# Patient Record
Sex: MALE | Race: BLACK OR AFRICAN AMERICAN | Employment: FULL TIME | URBAN - METROPOLITAN AREA
[De-identification: names, ages, dates, MRNs, and addresses within clinical notes are randomized per-mention and may not be internally consistent; named-entity substitution may affect disease eponyms.]

---

## 2021-11-22 ENCOUNTER — HOSPITAL ENCOUNTER (INPATIENT)
Age: 56
LOS: 2 days | Discharge: HOME | DRG: 683 | End: 2021-11-24
Attending: EMERGENCY MEDICINE | Admitting: INTERNAL MEDICINE
Payer: COMMERCIAL

## 2021-11-22 ENCOUNTER — APPOINTMENT (OUTPATIENT)
Dept: CT IMAGING | Age: 56
DRG: 683 | End: 2021-11-22
Attending: EMERGENCY MEDICINE
Payer: COMMERCIAL

## 2021-11-22 DIAGNOSIS — N17.9 ACUTE RENAL FAILURE, UNSPECIFIED ACUTE RENAL FAILURE TYPE (HCC): Primary | ICD-10-CM

## 2021-11-22 LAB
ALBUMIN SERPL-MCNC: 3.9 G/DL (ref 3.5–5)
ALBUMIN/GLOB SERPL: 0.9 {RATIO} (ref 1.1–2.2)
ALP SERPL-CCNC: 95 U/L (ref 45–117)
ALT SERPL-CCNC: 41 U/L (ref 12–78)
ANION GAP SERPL CALC-SCNC: 11 MMOL/L (ref 5–15)
APPEARANCE UR: ABNORMAL
AST SERPL-CCNC: 32 U/L (ref 15–37)
BACTERIA URNS QL MICRO: NEGATIVE /HPF
BASOPHILS # BLD: 0 K/UL (ref 0–0.1)
BASOPHILS NFR BLD: 0 % (ref 0–1)
BILIRUB SERPL-MCNC: 0.3 MG/DL (ref 0.2–1)
BILIRUB UR QL: NEGATIVE
BUN SERPL-MCNC: 34 MG/DL (ref 6–20)
BUN/CREAT SERPL: 8 (ref 12–20)
CALCIUM SERPL-MCNC: 9.2 MG/DL (ref 8.5–10.1)
CHLORIDE SERPL-SCNC: 102 MMOL/L (ref 97–108)
CO2 SERPL-SCNC: 28 MMOL/L (ref 21–32)
COLOR UR: ABNORMAL
CREAT SERPL-MCNC: 4.34 MG/DL (ref 0.7–1.3)
DIFFERENTIAL METHOD BLD: ABNORMAL
EOSINOPHIL # BLD: 0.1 K/UL (ref 0–0.4)
EOSINOPHIL NFR BLD: 1 % (ref 0–7)
EPITH CASTS URNS QL MICRO: ABNORMAL /LPF
ERYTHROCYTE [DISTWIDTH] IN BLOOD BY AUTOMATED COUNT: 14.4 % (ref 11.5–14.5)
GLOBULIN SER CALC-MCNC: 4.4 G/DL (ref 2–4)
GLUCOSE SERPL-MCNC: 102 MG/DL (ref 65–100)
GLUCOSE UR STRIP.AUTO-MCNC: NEGATIVE MG/DL
HCT VFR BLD AUTO: 41.9 % (ref 36.6–50.3)
HGB BLD-MCNC: 13.4 G/DL (ref 12.1–17)
HGB UR QL STRIP: ABNORMAL
IMM GRANULOCYTES # BLD AUTO: 0 K/UL (ref 0–0.04)
IMM GRANULOCYTES NFR BLD AUTO: 0 % (ref 0–0.5)
KETONES UR QL STRIP.AUTO: NEGATIVE MG/DL
LEUKOCYTE ESTERASE UR QL STRIP.AUTO: ABNORMAL
LYMPHOCYTES # BLD: 1.2 K/UL (ref 0.8–3.5)
LYMPHOCYTES NFR BLD: 13 % (ref 12–49)
MAGNESIUM SERPL-MCNC: 4 MG/DL (ref 1.6–2.4)
MCH RBC QN AUTO: 28.5 PG (ref 26–34)
MCHC RBC AUTO-ENTMCNC: 32 G/DL (ref 30–36.5)
MCV RBC AUTO: 89.1 FL (ref 80–99)
MONOCYTES # BLD: 0.8 K/UL (ref 0–1)
MONOCYTES NFR BLD: 9 % (ref 5–13)
NEUTS SEG # BLD: 6.6 K/UL (ref 1.8–8)
NEUTS SEG NFR BLD: 76 % (ref 32–75)
NITRITE UR QL STRIP.AUTO: POSITIVE
NRBC # BLD: 0 K/UL (ref 0–0.01)
NRBC BLD-RTO: 0 PER 100 WBC
PH UR STRIP: 7 [PH] (ref 5–8)
PLATELET # BLD AUTO: 216 K/UL (ref 150–400)
PMV BLD AUTO: 10.2 FL (ref 8.9–12.9)
POTASSIUM SERPL-SCNC: 4.4 MMOL/L (ref 3.5–5.1)
PROT SERPL-MCNC: 8.3 G/DL (ref 6.4–8.2)
PROT UR STRIP-MCNC: 100 MG/DL
RBC # BLD AUTO: 4.7 M/UL (ref 4.1–5.7)
RBC #/AREA URNS HPF: >100 /HPF (ref 0–5)
SODIUM SERPL-SCNC: 141 MMOL/L (ref 136–145)
SP GR UR REFRACTOMETRY: 1.01 (ref 1–1.03)
UA: UC IF INDICATED,UAUC: ABNORMAL
UROBILINOGEN UR QL STRIP.AUTO: 0.2 EU/DL (ref 0.2–1)
WBC # BLD AUTO: 8.7 K/UL (ref 4.1–11.1)
WBC URNS QL MICRO: ABNORMAL /HPF (ref 0–4)

## 2021-11-22 PROCEDURE — 83735 ASSAY OF MAGNESIUM: CPT

## 2021-11-22 PROCEDURE — 99285 EMERGENCY DEPT VISIT HI MDM: CPT

## 2021-11-22 PROCEDURE — 65270000029 HC RM PRIVATE

## 2021-11-22 PROCEDURE — 36415 COLL VENOUS BLD VENIPUNCTURE: CPT

## 2021-11-22 PROCEDURE — 74011250636 HC RX REV CODE- 250/636: Performed by: EMERGENCY MEDICINE

## 2021-11-22 PROCEDURE — 81001 URINALYSIS AUTO W/SCOPE: CPT

## 2021-11-22 PROCEDURE — 93005 ELECTROCARDIOGRAM TRACING: CPT

## 2021-11-22 PROCEDURE — 85025 COMPLETE CBC W/AUTO DIFF WBC: CPT

## 2021-11-22 PROCEDURE — 70450 CT HEAD/BRAIN W/O DYE: CPT

## 2021-11-22 PROCEDURE — 74011250636 HC RX REV CODE- 250/636: Performed by: INTERNAL MEDICINE

## 2021-11-22 PROCEDURE — 80053 COMPREHEN METABOLIC PANEL: CPT

## 2021-11-22 RX ORDER — ACETAMINOPHEN 650 MG/1
650 SUPPOSITORY RECTAL
Status: DISCONTINUED | OUTPATIENT
Start: 2021-11-22 | End: 2021-11-24 | Stop reason: HOSPADM

## 2021-11-22 RX ORDER — ACETAMINOPHEN 325 MG/1
650 TABLET ORAL
Status: DISCONTINUED | OUTPATIENT
Start: 2021-11-22 | End: 2021-11-24 | Stop reason: HOSPADM

## 2021-11-22 RX ORDER — SODIUM CHLORIDE 0.9 % (FLUSH) 0.9 %
5-40 SYRINGE (ML) INJECTION EVERY 8 HOURS
Status: DISCONTINUED | OUTPATIENT
Start: 2021-11-22 | End: 2021-11-24 | Stop reason: HOSPADM

## 2021-11-22 RX ORDER — SODIUM CHLORIDE 9 MG/ML
50 INJECTION, SOLUTION INTRAVENOUS CONTINUOUS
Status: DISCONTINUED | OUTPATIENT
Start: 2021-11-22 | End: 2021-11-23

## 2021-11-22 RX ORDER — POLYETHYLENE GLYCOL 3350 17 G/17G
17 POWDER, FOR SOLUTION ORAL DAILY PRN
Status: DISCONTINUED | OUTPATIENT
Start: 2021-11-22 | End: 2021-11-24 | Stop reason: HOSPADM

## 2021-11-22 RX ORDER — SODIUM CHLORIDE 0.9 % (FLUSH) 0.9 %
5-40 SYRINGE (ML) INJECTION AS NEEDED
Status: DISCONTINUED | OUTPATIENT
Start: 2021-11-22 | End: 2021-11-24 | Stop reason: HOSPADM

## 2021-11-22 RX ORDER — ONDANSETRON 2 MG/ML
4 INJECTION INTRAMUSCULAR; INTRAVENOUS
Status: DISCONTINUED | OUTPATIENT
Start: 2021-11-22 | End: 2021-11-24 | Stop reason: HOSPADM

## 2021-11-22 RX ORDER — LEVETIRACETAM 750 MG/1
750 TABLET ORAL 2 TIMES DAILY
Status: ON HOLD | COMMUNITY
End: 2021-11-24

## 2021-11-22 RX ORDER — ONDANSETRON 4 MG/1
4 TABLET, ORALLY DISINTEGRATING ORAL
Status: DISCONTINUED | OUTPATIENT
Start: 2021-11-22 | End: 2021-11-24 | Stop reason: HOSPADM

## 2021-11-22 RX ORDER — HEPARIN SODIUM 5000 [USP'U]/ML
5000 INJECTION, SOLUTION INTRAVENOUS; SUBCUTANEOUS EVERY 8 HOURS
Status: DISCONTINUED | OUTPATIENT
Start: 2021-11-22 | End: 2021-11-23

## 2021-11-22 RX ORDER — LAMOTRIGINE 200 MG/1
200 TABLET, EXTENDED RELEASE ORAL DAILY
Status: ON HOLD | COMMUNITY
End: 2021-11-24

## 2021-11-22 RX ORDER — TAMSULOSIN HYDROCHLORIDE 0.4 MG/1
0.8 CAPSULE ORAL DAILY
COMMUNITY

## 2021-11-22 RX ADMIN — SODIUM CHLORIDE 1000 ML: 9 INJECTION, SOLUTION INTRAVENOUS at 14:10

## 2021-11-22 RX ADMIN — Medication 10 ML: at 21:48

## 2021-11-22 RX ADMIN — SODIUM CHLORIDE 50 ML/HR: 9 INJECTION, SOLUTION INTRAVENOUS at 21:45

## 2021-11-22 NOTE — ED TRIAGE NOTES
Attempted triage pt in bathroom with wife for extended period did not open door when assist offered. Pt then  was wheeled to the treatment area accompanied by his wife. Pt wife states Afshin Field is being treated for enlarged prostate and they had to put a catheter in today at [facility] after putting the catheter in he had dizziness that persisted so they said to bring him to the ER for further evaluation. \" Pt states \"we got something to eat before coming here and the dizziness is getting better. \" Pt denies other symptoms. Dr Joan Padilla in room. Pt wife now states \"the dizziness was there before they put the catheter in and they emptied 2 liters. The dizziness has been off and on for a few days\" Pt states \"I feel the dizziness increased after they took the fluids out. \"

## 2021-11-22 NOTE — ED NOTES
Pt arrrived to er with meza catheter that was placed today at [facility] urology. Pt is draining orange colored urine.

## 2021-11-22 NOTE — ED PROVIDER NOTES
60-year-old male with a history of enlarged prostate presents with chief complaint of lightheadedness. Patient reports that he has had intermittent lightheadedness for several days. He went to the urologist today and was found to have 2 L of retained urine. Corea catheter was placed. His lightheadedness became much worse after the catheter was placed. Patient did stop and get something to eat on the way to the emergency department and reports that he is now feeling somewhat better. He denies any other symptoms. No past medical history on file. No past surgical history on file. No family history on file. Social History     Socioeconomic History    Marital status: Not on file     Spouse name: Not on file    Number of children: Not on file    Years of education: Not on file    Highest education level: Not on file   Occupational History    Not on file   Tobacco Use    Smoking status: Not on file    Smokeless tobacco: Not on file   Substance and Sexual Activity    Alcohol use: Not on file    Drug use: Not on file    Sexual activity: Not on file   Other Topics Concern    Not on file   Social History Narrative    Not on file     Social Determinants of Health     Financial Resource Strain:     Difficulty of Paying Living Expenses: Not on file   Food Insecurity:     Worried About Running Out of Food in the Last Year: Not on file    Sukhi of Food in the Last Year: Not on file   Transportation Needs:     Lack of Transportation (Medical): Not on file    Lack of Transportation (Non-Medical):  Not on file   Physical Activity:     Days of Exercise per Week: Not on file    Minutes of Exercise per Session: Not on file   Stress:     Feeling of Stress : Not on file   Social Connections:     Frequency of Communication with Friends and Family: Not on file    Frequency of Social Gatherings with Friends and Family: Not on file    Attends [employer] Services: Not on file    Active Member of Clubs or Organizations: Not on file    Attends Club or Organization Meetings: Not on file    Marital Status: Not on file   Intimate Partner Violence:     Fear of Current or Ex-Partner: Not on file    Emotionally Abused: Not on file    Physically Abused: Not on file    Sexually Abused: Not on file   Housing Stability:     Unable to Pay for Housing in the Last Year: Not on file    Number of [place] in the Last Year: Not on file    Unstable Housing in the Last Year: Not on file         ALLERGIES: Patient has no known allergies. Review of Systems   Constitutional: Negative for fever. HENT: Negative for rhinorrhea. Respiratory: Negative for cough. Cardiovascular: Negative for chest pain. Gastrointestinal: Negative for abdominal pain. Genitourinary: Negative for dysuria. Musculoskeletal: Negative for back pain. Skin: Negative for wound. Neurological: Positive for light-headedness. Negative for headaches. Psychiatric/Behavioral: Negative for confusion. There were no vitals filed for this visit. Physical Exam  Vitals and nursing note reviewed. Constitutional:       General: He is not in acute distress. Appearance: Normal appearance. He is not ill-appearing, toxic-appearing or diaphoretic. HENT:      Head: Normocephalic. Mouth/Throat:      Mouth: Mucous membranes are dry. Eyes:      Extraocular Movements: Extraocular movements intact. Cardiovascular:      Rate and Rhythm: Normal rate. Pulses: Normal pulses. Heart sounds: Normal heart sounds. Pulmonary:      Effort: Pulmonary effort is normal. No respiratory distress. Breath sounds: Normal breath sounds. Abdominal:      General: Abdomen is flat. Bowel sounds are normal. There is no distension. Palpations: Abdomen is soft. Tenderness: There is no abdominal tenderness. There is no guarding. Musculoskeletal:         General: Normal range of motion.       Cervical back: Normal range of motion. Skin:     General: Skin is warm and dry. Capillary Refill: Capillary refill takes less than 2 seconds. Neurological:      General: No focal deficit present. Mental Status: He is alert and oriented to person, place, and time. Cranial Nerves: No cranial nerve deficit. Motor: No weakness. Gait: Gait normal.   Psychiatric:         Mood and Affect: Mood normal.          MDM  Number of Diagnoses or Management Options  Acute renal failure, unspecified acute renal failure type [facility])  Diagnosis management comments: Patient presents with dizziness. He is able to ambulate normally. Work-up reveals acute renal failure likely due to obstructive uropathy. Patient has a Corea catheter in place. He will be admitted to hospital medicine for further management. He is comfortable and agreeable to plan of care and will be transferred to [address] [place] [address] by ambulance. EKG shows normal sinus rhythm at a rate of 82, normal intervals, normal axis, no ischemic changes. Perfect Serve Consult for Admission  3:51 PM    ED Room Number: WER15/15  Patient Name and age:  Deepa Payton 55 y.o.  male  Working Diagnosis: Acute renal failure, unspecified acute renal failure type ([facility] [address]  (primary encounter diagnosis)    COVID-19 Suspicion:  no  Sepsis present:  no  Reassessment needed: no  Code Status:  Full Code  Readmission: no  Isolation Requirements:  no  Recommended Level of Care:  telemetry  Department:[facility] ED - [phone]  Other: Postobstructive. Patient seen at [facility] and had a Corea catheter placed.          Amount and/or Complexity of Data Reviewed  Clinical lab tests: ordered and reviewed  Tests in the radiology section of CPT®: ordered and reviewed           Procedures

## 2021-11-23 ENCOUNTER — APPOINTMENT (OUTPATIENT)
Dept: ULTRASOUND IMAGING | Age: 56
DRG: 683 | End: 2021-11-23
Attending: INTERNAL MEDICINE
Payer: COMMERCIAL

## 2021-11-23 PROBLEM — N40.0 BPH (BENIGN PROSTATIC HYPERPLASIA): Status: ACTIVE | Noted: 2021-11-23

## 2021-11-23 PROBLEM — G40.909 SEIZURE DISORDER (HCC): Status: ACTIVE | Noted: 2021-11-23

## 2021-11-23 PROBLEM — R03.0 ELEVATED BP WITHOUT DIAGNOSIS OF HYPERTENSION: Status: ACTIVE | Noted: 2021-11-23

## 2021-11-23 LAB
ANION GAP SERPL CALC-SCNC: 5 MMOL/L (ref 5–15)
BUN SERPL-MCNC: 26 MG/DL (ref 6–20)
BUN/CREAT SERPL: 10 (ref 12–20)
CALCIUM SERPL-MCNC: 9.3 MG/DL (ref 8.5–10.1)
CHLORIDE SERPL-SCNC: 117 MMOL/L (ref 97–108)
CO2 SERPL-SCNC: 26 MMOL/L (ref 21–32)
COMMENT, HOLDF: NORMAL
CREAT SERPL-MCNC: 2.55 MG/DL (ref 0.7–1.3)
GLUCOSE SERPL-MCNC: 92 MG/DL (ref 65–100)
POTASSIUM SERPL-SCNC: 4.4 MMOL/L (ref 3.5–5.1)
SAMPLES BEING HELD,HOLD: NORMAL
SODIUM SERPL-SCNC: 148 MMOL/L (ref 136–145)

## 2021-11-23 PROCEDURE — 74011250636 HC RX REV CODE- 250/636: Performed by: INTERNAL MEDICINE

## 2021-11-23 PROCEDURE — 76770 US EXAM ABDO BACK WALL COMP: CPT

## 2021-11-23 PROCEDURE — 94760 N-INVAS EAR/PLS OXIMETRY 1: CPT

## 2021-11-23 PROCEDURE — 97116 GAIT TRAINING THERAPY: CPT

## 2021-11-23 PROCEDURE — 65660000000 HC RM CCU STEPDOWN

## 2021-11-23 PROCEDURE — 74011250637 HC RX REV CODE- 250/637: Performed by: INTERNAL MEDICINE

## 2021-11-23 PROCEDURE — 97161 PT EVAL LOW COMPLEX 20 MIN: CPT

## 2021-11-23 PROCEDURE — 80048 BASIC METABOLIC PNL TOTAL CA: CPT

## 2021-11-23 RX ORDER — SODIUM CHLORIDE 9 MG/ML
100 INJECTION, SOLUTION INTRAVENOUS CONTINUOUS
Status: DISCONTINUED | OUTPATIENT
Start: 2021-11-23 | End: 2021-11-24 | Stop reason: HOSPADM

## 2021-11-23 RX ORDER — TAMSULOSIN HYDROCHLORIDE 0.4 MG/1
0.8 CAPSULE ORAL DAILY
Status: DISCONTINUED | OUTPATIENT
Start: 2021-11-23 | End: 2021-11-24 | Stop reason: HOSPADM

## 2021-11-23 RX ORDER — LAMOTRIGINE 200 MG/1
400 TABLET, EXTENDED RELEASE ORAL 2 TIMES DAILY
Status: DISCONTINUED | OUTPATIENT
Start: 2021-11-23 | End: 2021-11-24 | Stop reason: HOSPADM

## 2021-11-23 RX ORDER — LEVETIRACETAM 500 MG/1
1500 TABLET, EXTENDED RELEASE ORAL DAILY
Status: DISCONTINUED | OUTPATIENT
Start: 2021-11-23 | End: 2021-11-23

## 2021-11-23 RX ORDER — LEVETIRACETAM 750 MG/1
1500 TABLET, EXTENDED RELEASE ORAL DAILY
Status: DISCONTINUED | OUTPATIENT
Start: 2021-11-24 | End: 2021-11-24 | Stop reason: HOSPADM

## 2021-11-23 RX ORDER — LEVETIRACETAM 750 MG/1
2250 TABLET, EXTENDED RELEASE ORAL
Status: DISCONTINUED | OUTPATIENT
Start: 2021-11-23 | End: 2021-11-24 | Stop reason: HOSPADM

## 2021-11-23 RX ADMIN — LEVETIRACETAM 2250 MG: 750 TABLET, EXTENDED RELEASE ORAL at 21:04

## 2021-11-23 RX ADMIN — LAMOTRIGINE 400 MG: 200 TABLET, EXTENDED RELEASE ORAL at 07:00

## 2021-11-23 RX ADMIN — LAMOTRIGINE 400 MG: 200 TABLET, EXTENDED RELEASE ORAL at 21:03

## 2021-11-23 RX ADMIN — LEVETIRACETAM 1500 MG: 500 TABLET, EXTENDED RELEASE ORAL at 06:38

## 2021-11-23 RX ADMIN — Medication 10 ML: at 14:00

## 2021-11-23 RX ADMIN — TAMSULOSIN HYDROCHLORIDE 0.8 MG: 0.4 CAPSULE ORAL at 08:32

## 2021-11-23 RX ADMIN — Medication 10 ML: at 06:39

## 2021-11-23 RX ADMIN — SODIUM CHLORIDE 100 ML/HR: 9 INJECTION, SOLUTION INTRAVENOUS at 01:34

## 2021-11-23 RX ADMIN — Medication 10 ML: at 21:03

## 2021-11-23 NOTE — PROGRESS NOTES
Bedside shift change report given to [facility], RN (oncoming nurse) by Jayla Hernandez RN (offgoing nurse). Report included the following information SBAR, Kardex, Intake/Output, MAR and Recent Results.

## 2021-11-23 NOTE — PROGRESS NOTES
Problem: Patient Education: Go to Patient Education Activity  Goal: Patient/Family Education  Outcome: Progressing Towards Goal   PHYSICAL THERAPY EVALUATION/DISCHARGE  Patient: Khushi Rodriguez (40 y.o. male)  Date: 11/23/2021  Primary Diagnosis: ROSAURA (acute kidney injury) ([facility] [address] [N17.9]        Precautions:          ASSESSMENT  Based on the objective data described below, the patient presents with admission due to ROSAURA with recent procedure due enlarged prostate/BPH. Pt c/o dizziness and inabliity to void s/p procedure requiring coming to ER. Pt received supine in bed with meza in place. Stating dizziness has improved. Orthostatic BP taken with no orthostatic BP findings and no c/o dizziness. Pt demonstrating independence with all mobility and good meza management with gait of 50' in room. Pt cleared to be up ad trent. RN notified. Functional Outcome Measure: The patient scored 90/100 on the barthel outcome measure     Other factors to consider for discharge: none     Further skilled acute physical therapy is not indicated at this time. PLAN :  Recommendation for discharge: (in order for the patient to meet his/her long term goals)  No skilled physical therapy/ follow up rehabilitation needs identified at this time. This discharge recommendation:  Has not yet been discussed the attending provider and/or case management    IF patient discharges home will need the following DME: none       SUBJECTIVE:   Patient stated I am ok.     OBJECTIVE DATA SUMMARY:   HISTORY:    Past Medical History:   Diagnosis Date    Ill-defined condition     prostate enlarged    Seizures ([facility] [address]      Past Surgical History:   Procedure Laterality Date    HX GI      hemmoriod    HX UROLOGICAL      Vasectomy       Prior level of function: independent and working  Personal factors and/or comorbidities impacting plan of care: per above and below    Home Situation  Home Environment: Private residence  # Steps to Enter: 5  [employer] to Enter: Yes  Hand Rails : Bilateral  One/Two Story Residence: Two story  # of Interior Steps: 13  Interior Rails: Both  Lift Chair Available: No  Living Alone: No  Support Systems: Spouse/Significant Other, Child(isabella)  Patient Expects to be Discharged to[de-identified] [employer]  Current DME Used/Available at Home: None  Tub or Shower Type: Shower    EXAMINATION/PRESENTATION/DECISION MAKING:   Critical Behavior:  Neurologic State: Alert  Orientation Level: Oriented X4  Cognition: Follows commands, Appropriate decision making, Appropriate for age attention/concentration, Appropriate safety awareness     Hearing: Auditory  Auditory Impairment: None  Skin:  IV; meza  Edema: WNL  Range Of Motion:  AROM: Within functional limits                       Strength:    Strength: Within functional limits                    Tone & Sensation:   Tone: Normal              Sensation: Intact               Coordination:  Coordination: Within functional limits  Vision:      Functional Mobility:  Bed Mobility:  Rolling: Independent  Supine to Sit: Independent  Sit to Supine: Independent  Scooting: Independent  Transfers:  Sit to Stand: Independent  Stand to Sit: Independent                       Balance:   Sitting: Intact  Standing: Intact  Ambulation/Gait Training:  Distance (ft): 50 Feet (ft)  Assistive Device: Gait belt  Ambulation - Level of Assistance: Independent                                           Functional Measure:  Barthel Index:    Bathin  Bladder: 0 (meza)  Bowels: 10  Groomin  Dressing: 10  Feeding: 10  Mobility: 15  Stairs: 10  Toilet Use: 10  Transfer (Bed to Chair and Back): 15  Total: 90/100       The Barthel ADL Index: Guidelines  1. The index should be used as a record of what a patient does, not as a record of what a patient could do. 2. The main aim is to establish degree of independence from any help, physical or verbal, however minor and for whatever reason.   3. The need for supervision renders the patient not independent. 4. A patient's performance should be established using the best available evidence. Asking the patient, friends/relatives and nurses are the usual sources, but direct observation and common sense are also important. However direct testing is not needed. 5. Usually the patient's performance over the preceding 24-48 hours is important, but occasionally longer periods will be relevant. 6. Middle categories imply that the patient supplies over 50 per cent of the effort. 7. Use of aids to be independent is allowed. Score Interpretation (from [address] [facility])    Independent   60-79 Minimally independent   40-59 Partially dependent   20-39 Very dependent   <20 Totally dependent     -Shahid Norwood., Barthel, D.W. (1965). Functional evaluation: the Barthel Index. [facility] ([address] (1997). The Barthel activities of daily living index: self-reporting versus actual performance in the old (> or = 75 years). Journal of [address] [place] [address] 45(7), [address] [facility], JMILKAF, Rachael Montgomery., [facility]. (1999). Measuring the change in disability after inpatient rehabilitation; comparison of the responsiveness of the Barthel Index and Functional Humphreys Measure. Journal of Neurology, Neurosurgery, and Psychiatry, 66(4), 418-573. Dionisio Cabrera, N.J.A, ETHAN Collins, & Keysha Massey MТатьянаA. (2004) Assessment of post-stroke quality of life in cost-effectiveness studies: The usefulness of the Barthel Index and the EuroQoL-5D.  Quality of Life Research, 15, 115-70           Physical Therapy Evaluation Charge Determination   History Examination Presentation Decision-Making   LOW Complexity : Zero comorbidities / personal factors that will impact the outcome / POC LOW Complexity : 1-2 Standardized tests and measures addressing body structure, function, activity limitation and / or participation in recreation  LOW Complexity : Stable, uncomplicated Other outcome measures barthel  LOW       Based on the above components, the patient evaluation is determined to be of the following complexity level: LOW     Pain Rating:  No c/o pain    Activity Tolerance:   Good      After treatment patient left in no apparent distress:   Sitting in chair with call bell    COMMUNICATION/EDUCATION:   The patients plan of care was discussed with: Occupational therapist and Registered nurse. Fall prevention education was provided and the patient/caregiver indicated understanding., Patient/family have participated as able in goal setting and plan of care. , and Patient/family agree to work toward stated goals and plan of care.     Thank you for this referral.  González Andrews, PT   Time Calculation: 23 mins

## 2021-11-23 NOTE — CONSULTS
New Urology Consult Note    Patient: Adriana Hayward MRN: [id number]  SSN: [id number]    YOB: 1965  Age: 55 y.o. Sex: male            Assessment:     Adriana Hayward is a 55 y.o. male with a PMH of seizure disorder and BPH. Pt seen by [facility], Dr. Negro Camacho, yesterday due to urinary retention; meza was placed in office with approx 2L of urine return. Reported intermittent dizziness that occurred prior to meza placement and continued after. Pt was referred to ER for further assessment and was admitted due to ROSAURA. Recommendations:     1. Meza to remain for bladder decompression  2. Continue Flomax  3. Cr 4.99 in office yesterday, today 2.55  4. F/U as scheduled OP; will recheck BMP at this time - appt placed in discharge orders     OK to discharge from Urology perspective. Will sign off. Thank you for this consult. Please contact [facility] with any further questions/concerns. Belinda Espinoza NP [phone]    History of Present Illness:     Chief Complaint:  Urinary retention     Adriana Hayward is seen in consultation for reasons noted above at the request of Nasim Walls DO. This is a 55 y.o. male with a known urologic history of BPH seen at [facility]. Pt presents with urinary retention r/t BPH, reporting he had not voiding in 2-3 days. Was seen in office by Bassem Clouding was placed returning 2L urine, providing relief from retention. After procedure, pt reported intermittent dizziness prior to and post meza placement and was referred to ER for further evaluation. Currently taking flomax and tadalafil to alleviate symptoms of BPH. Pt has scheduled surgical intervention, PVP, on 12/10/21 with Dr. Kimberly Gutierrez.      VSS   No leukocytosis  Cr 2.55 (4.34)  Good urine output     Subjective     Past Medical History  Past Medical History:   Diagnosis Date    Ill-defined condition     prostate enlarged    Seizures ([address]        Past Surgical History:   Past Surgical History:   Procedure Laterality Date    HX GI      hemmoriod    HX UROLOGICAL      Vasectomy       Medication:  Current Facility-Administered Medications   Medication Dose Route Frequency Provider Last Rate Last Admin    lamoTRIgine (LaMICtal XR) ER tablet 400 mg (Patient Supplied)  400 mg Oral BID Payton Maldonado MD   400 mg at 11/23/21 0700    tamsulosin (FLOMAX) capsule 0.8 mg  0.8 mg Oral DAILY Payton Maldonado MD   0.8 mg at 11/23/21 5885    0.9% sodium chloride infusion  100 mL/hr IntraVENous CONTINUOUS Payton Maldonado  mL/hr at 11/23/21 0134 100 mL/hr at 11/23/21 0134    levETIRAcetam (KEPPRA XR) ER tablet 2,250 mg (Patient Supplied)  2,250 mg Oral QHS Payton Maldonado MD        [START ON 11/24/2021] levETIRAcetam (KEPPRA XR) ER tablet 1,500 mg (Patient Supplied)  1,500 mg Oral DAILY Payton Maldonado MD        sodium chloride (NS) flush 5-40 mL  5-40 mL IntraVENous Q8H Namrata Fairly, DO   10 mL at 11/23/21 5956    sodium chloride (NS) flush 5-40 mL  5-40 mL IntraVENous PRN Namrata Fairly, DO        acetaminophen (TYLENOL) tablet 650 mg  650 mg Oral Q6H PRN Namrata Fairly, DO        Or    acetaminophen (TYLENOL) suppository 650 mg  650 mg Rectal Q6H PRN Namrata Fairly, DO        polyethylene glycol (MIRALAX) packet 17 g  17 g Oral DAILY PRN Namrtaa Fairly, DO        ondansetron (ZOFRAN ODT) tablet 4 mg  4 mg Oral Q8H PRN Conner, Claudia, DO        Or    ondansetron (ZOFRAN) injection 4 mg  4 mg IntraVENous Q6H PRN Namrata Fairly, DO           Allergies:  No Known Allergies    Social History:  Social History     Tobacco Use    Smoking status: Never Smoker    Smokeless tobacco: Never Used   Substance Use Topics    Alcohol use: Never    Drug use: Never       Family History  Family History   Problem Relation Age of Onset    Prostate Cancer Father        Review of Systems  Unchanged from admitting provider note from 11/23/21 other than HPI.     Objective:     Vital signs in last 24 hours:  Visit Vitals  /80 (BP 1 Location: Right upper arm, BP Patient Position: At rest)   Pulse 85   Temp 98.8 °F (37.1 °C)   Resp 18   Ht 5' 10\" (1.778 m)   Wt 92.1 kg (203 lb)   SpO2 92%   BMI 29.13 kg/m²       Intake/Output last 3 shifts:  Date 11/22/21 0700 - 11/23/21 0659 11/23/21 0700 - 11/24/21 0659   Shift 4659-2071 2051-8898 24 Hour Total 3256-3875 1786-8871 24 Hour Total   INTAKE   P.O.  0 0        P. O.  0 0      I. V.(mL/kg/hr) 1000(0.9)  1000(0.5)        Volume (sodium chloride 0.9 % bolus infusion 1,000 mL) 1000  1000      Shift Total(mL/kg) 1000(10.9) 0(0) 1000(10.9)      OUTPUT   Urine(mL/kg/hr) 1200(1.1) 650(0.6) 1850(0.8) 1950  1950     Urine Output (mL) (Urinary Catheter 11/22/21) 1200 [address]   Other 675  675        Other Output 675  675      Shift Total(mL/kg) 1875(20.4) 650(7.1) 2525(27.4) 1950(21.2)  1950(21.2)   NET -875 -650 -1525 -1950  -1950   Weight (kg) 92.1 92.1 92.1 92.1 92.1 92.1       Physical Exam  General Appearance: NAD, awake  HENT: atraumatic, normal ears  Cardiovascular: not tachycardic, no LE edema  Respiratory: no distress, room air  Abdomen: soft, no suprapubic fullness or tenderness  : no CVA tenderness, meza in place and draining yellow, clear urine   Extremities: moves all  Musculoskeletal: normal alignment of neck and head  Neuro: Appropriate, no focal neurological deficits  Mood/Affect: appropriate, A&O x 3    Lab/Imaging Review:       Most Recent Labs:  Lab Results   Component Value Date/Time    WBC 8.7 11/22/2021 02:04 PM    HGB 13.4 11/22/2021 02:04 PM    HCT 41.9 11/22/2021 02:04 PM    PLATELET 154 19/71/8903 02:04 PM    MCV 89.1 11/22/2021 02:04 PM        Lab Results   Component Value Date/Time    Sodium 148 (H) 11/23/2021 01:38 AM    Potassium 4.4 11/23/2021 01:38 AM    Chloride 117 (H) 11/23/2021 01:38 AM    CO2 26 11/23/2021 01:38 AM    Anion gap 5 11/23/2021 01:38 AM    Glucose 92 11/23/2021 01:38 AM    BUN 26 (H) 11/23/2021 01:38 AM Creatinine 2.55 (H) 11/23/2021 01:38 AM    BUN/Creatinine ratio 10 (L) 11/23/2021 01:38 AM    GFR est AA 32 (L) 11/23/2021 01:38 AM    GFR est non-AA 26 (L) 11/23/2021 01:38 AM    Calcium 9.3 11/23/2021 01:38 AM    Bilirubin, total 0.3 11/22/2021 02:04 PM    Alk. phosphatase 95 11/22/2021 02:04 PM    Protein, total 8.3 (H) 11/22/2021 02:04 PM    Albumin 3.9 11/22/2021 02:04 PM    Globulin 4.4 (H) 11/22/2021 02:04 PM    A-G Ratio 0.9 (L) 11/22/2021 02:04 PM    ALT (SGPT) 41 11/22/2021 02:04 PM        No results found for: PSA, [address] [place], PSA3, PSAR3, [id number], [id number], 65191, PSAEXT     COAGS:  No results found for: APTT, PTP, INR, INREXT    No results found for: HBA1C, XTR8WTLI, IIG4DZPV     No results found for: CPK, RCK1, RCK2, RCK3, RCK4, CKMB, CKNDX, CKND1, TROPT, TROIQ, BNPP, BNP       Urine/Blood Cultures:  Results     ** No results found for the last 336 hours. **             IMAGING:  CT HEAD WO CONT    Result Date: 11/22/2021  HEAD CT WITHOUT CONTRAST: 11/22/2021 2:31 PM INDICATION: dizziness COMPARISON: None. PROCEDURE: Axial images of the head were obtained without contrast. Coronal and sagittal reformats were performed. CT dose reduction was achieved through use of a standardized protocol tailored for this examination and automatic exposure control for dose modulation. Adaptive statistical iterative reconstruction (ASIR) was utilized. FINDINGS: The ventricles and sulci are appropriate in size and configuration for age. No loss of gray-white differentiation to suggest late acute or early subacute infarction. No mass effect or intracranial hemorrhage. No acute intracranial abnormality.           Signed By: Francis Russell NP  - November 23, 2021

## 2021-11-23 NOTE — PROGRESS NOTES
[facility]: MED RECONCILIATION    Comments/Recommendations:   Appreciate med rec reviewed by nursing  PTA med rec completed by nursing is c/w Rx query  Patient's wife brought in his Lamictal and Keppra from home for use while in the hospital. These medications will be labeled and placed in the patient specific bin in Pyxis and are to be returned to patient prior to discharge. Information obtained from: nursing med rec, Rx query    Significant PMH/Disease States:   Past Medical History:   Diagnosis Date    Ill-defined condition     prostate enlarged    Seizures [facility])      Chief Complaint for this Admission:   Chief Complaint   Patient presents with    Dizziness     Allergies: Patient has no known allergies. Prior to Admission Medications:     Medication Documentation Review Audit       Reviewed by JUAN MIGUEL LorenzD (Pharmacist) on 11/23/21 at 0837      Medication Sig Documenting Provider Last Dose Status Taking?   lamoTRIgine (LaMICtal XR) 200 mg tr24 ER tablet Take 200 mg by mouth daily. 2 in am and 2 in PM Sonu, MD Elias 11/22/2021 Unknown time Active Yes   levETIRAcetam (Keppra) 750 mg tablet Take 750 mg by mouth two (2) times a day. 2 in am and 3 in PM Elias Ascencio MD 11/22/2021 Unknown time Active Yes   tamsulosin (FLOMAX) 0.4 mg capsule Take 0.8 mg by mouth daily. Other, MD Elias  Active Yes                  Thank you,  Lewis CHO, [facility]

## 2021-11-23 NOTE — ED NOTES
TRANSFER - OUT REPORT:    Verbal report given to Polina Yusuf RN on Marval Course  being transferred to [facility] Rm 514 for routine progression of care       Report consisted of patients Situation, Background, Assessment and   Recommendations(SBAR). Information from the following report(s) Kardex was reviewed with the receiving nurse. Lines:   Peripheral IV 11/22/21 (Active)   Site Assessment Clean, dry, & intact 11/22/21 1407   Phlebitis Assessment 0 11/22/21 1407   Infiltration Assessment 0 11/22/21 1407   Dressing Status Clean, dry, & intact 11/22/21 1407        Opportunity for questions and clarification was provided.       Patient transported with:   Monitor

## 2021-11-23 NOTE — ED NOTES
NS infusing at 50ml/hr. Urine sample sent. Patient educated on NPO status, verbalized understanding, mouth swabs provided for oral hydration. Family at bedside, meza leg bag emptied. Pt declines any further needs at this time.

## 2021-11-23 NOTE — H&P
[address]  [address] [facility], [place], [place]  [phone]    Admission History and Physical      NAME:     Yesica Wheatley   :      1965   MRN:     [id number]     PCP:     Sandra Ziegler MD     Date of Service: 2021     Chief  Complaint: Acute urine retention    History Of Presenting Illness:       Mr. Harman Yost is a 55 y.o. male who is being admitted for ROSAURA (acute kidney injury) ([facility] [address] Mr. Harman Yost presented to the Emergency Department at the [facility] after being f=reffered there by [facility]. He has a hx of BPH for which he is undergoing monitoring and he has surgery planned for 12/10. He has in the recent past developed recurrent episodes of urine retention associated with bilateral flank aching pain. No fever or chills. In the office, he had a meza catheter inserted that removed about 2L of urine. He was sent to the ED where he was found to have acute kidney injury. He sates he has been having intermittent dizziness and lightheadedness but says this has now resolved. He will be admitted for further management. No Known Allergies    Prior to Admission medications    Medication Sig Start Date End Date Taking? Authorizing Provider   levETIRAcetam (Keppra) 750 mg tablet Take 750 mg by mouth two (2) times a day. 2 in am and 3 in PM   Yes Other, MD Elias   lamoTRIgine (LaMICtal XR) 200 mg tr24 ER tablet Take 200 mg by mouth daily. 2 in am and 2 in PM   Yes Other, MD Elias   tamsulosin (FLOMAX) 0.4 mg capsule Take 0.8 mg by mouth daily.    Yes Other, MD Elias       Past Medical History:   Diagnosis Date    Ill-defined condition     prostate enlarged    Seizures ([facility] [address]         Past Surgical History:   Procedure Laterality Date    HX GI      hemmoriod    HX UROLOGICAL      Vasectomy       Social History     Tobacco Use    Smoking status: Never Smoker    Smokeless tobacco: Never Used   Substance Use Topics    Alcohol use: Never Family History   Problem Relation Age of Onset    Prostate Cancer Father         Review of Systems:    Constitutional ROS: no fever, chills, rigors or night sweats  Respiratory ROS: no cough, sputum, hemoptysis, dyspnea or pleuritic pain. Cardiovascular ROS: no chest pain, palpitations, orthopnea, PND or syncope  Endocrine ROS: no polydispsia, polyuria, heat or cold intolerance or major weight change. Gastrointestinal ROS: no dysphagia, abdominal pain, nausea, vomiting, diarrhea or any bleeding   Genito-Urinary ROS: no dysuria, frequency but slight hematuria, retention and flank pain  Musculoskeletal ROS: no joint pain, swelling or muscular tenderness  Neurological ROS: no headache, confusion, focal weakness or any other neurological symptoms  Psychiatric ROS: no depression, anxiety, mood swings  Dermatological ROS: no rash, pruritis, or urticaria    Examination:    Vital signs:    Visit Vitals  BP (!) 146/90 (BP 1 Location: Right upper arm, BP Patient Position: At rest)   Pulse 90   Temp 98.6 °F (37 °C)   Resp 18   Ht 5' 10\" (1.778 m)   Wt 92.1 kg (203 lb)   SpO2 95%   BMI 29.13 kg/m²         Physical Examination:    General:  Weak and ill looking patient in no acute distress  Eyes: Pink conjunctivae, PERRLA with no discharge. Ear, Nose & Throat: No ottorrhea, rhinorrhea and has dry mucous membranes  Respiratory:  No accessory muscle use, clear breath sounds without crackles or wheezes  Cardiovascular:  No JVD or murmurs, regular and normal S1, S2 without thrills, bruits or peripheral edema  GI & :  Soft abdomen, non-tender, non-distended, normoactive bowel sounds, bilateral flank discomfort  Heme:  No cervical or axillary adenopathy.    Musculoskeletal:  No cyanosis, clubbing, atrophy or deformities  Skin:  No rashes, bruising or ulcers   Neurological: Awake and alert, speech is clear, CNs 2-12 are grossly intact and otherwise non focal  Psychiatric:  Has a good insight and is oriented x 3  _____________________________________________________________________    Data Review:    Labs:    Recent Labs     11/22/21  1404   WBC 8.7   HGB 13.4   HCT 41.9        Recent Labs     11/22/21  1404      K 4.4      CO2 28   *   BUN 34*   CREA 4.34*   CA 9.2   MG 4.0*   ALB 3.9   ALT 41     No components found for: GLPOC  No results for input(s): PH, PCO2, PO2, HCO3, FIO2 in the last 72 hours. No results for input(s): INR, INREXT in the last 72 hours. Radiological Studies:      CT scan head - neg     Personally reviewed EKG: Normal rate, rhythm, axis and intervals. and No acute changes suggestive of ischemia    I have reviewed old medical records available. Assessment & Impression:     Mr. Juanita Watkins is a 55 y.o. male being evaluated for:     Principal Problem:    ROSAURA (acute kidney injury) ([facility] [address] (11/22/2021)    Active Problems:    BPH (benign prostatic hyperplasia) (11/23/2021)      Elevated BP without diagnosis of hypertension (11/23/2021)      Seizure disorder ([address] (11/23/2021)         Plan of management:    ROSAURA (acute kidney injury) ([facility] [address] (11/22/2021) POA: this is likely due to acute urine retention. Now has a meza catheter. Admit to hospital. Start IV fluids, monitor renal function, get a retroperitoneal ultrasound. Consult nephrology if no improvement is noted    BPH (benign prostatic hyperplasia) (11/23/2021) POA: now with acute urine retention. Meza placed. +hematuria. Has outpatient surgery scheduled for 12/10. Given acute changes, consult Urology. Resume Flomax    Elevated BP without diagnosis of hypertension (11/23/2021) POA: denies any hx of HTN.  Monitor BP for now    Seizure disorder ([facility] [address] (11/23/2021) POA: resume home Keppra and Lamictal (specific home medications)    Code Status:  Full    Surrogate decision maker: Family    Risk of deterioration: high      Total time spent for the care of the patient: [address] discussed with: Patient, Family, Nursing Staff and the ED physician    Discussed:  Code Status, Care Plan and D/C Planning    Prophylaxis:  SCD's    Probable Disposition:  Home w/Family           ___________________________________________________    Attending Physician: Jose Enriquez MD

## 2021-11-23 NOTE — ED NOTES
Handoff report to Sandra with [facility]. Wife at bedside, took patient's belongings home. Patient being transported to [facility] Rm 514.

## 2021-11-23 NOTE — PROGRESS NOTES
OT order received, chart reviewed, team members consulted. Patient is feeling much better; he is up ad trent for mobility, and completes basic ADL with independence. Patient reportedly has had indwelling urinary catheter in the past and knows how to manage in relation to LB dressing. No assistive devices needed for ADL at this time. No skilled OT services indicated. Completing order .      Mariama Chacko, OTR/L

## 2021-11-23 NOTE — ED NOTES
Received report from [facility] JOSEFINA. Pt aox4, sitting up in bed. Plan to admit pt for continued care.

## 2021-11-23 NOTE — PROGRESS NOTES
Patient seen and examined in 65. Discussed labs and improvement in creatinine. All questions addressed to his satisfaction.

## 2021-11-23 NOTE — PROGRESS NOTES
11/23/2021  Reason for Admission: ROSAURA                   RUR Score:     7% low                Plan for utilizing home health:    No history, patient unlikely to need this admission      PCP: First and Last name:  Radha Owen MD   Name of Practice:    Are you a current patient: Yes/No:  Yes   Approximate date of last visit: 4 months ago approximately   Can you participate in a virtual visit with your PCP:  Yes                    Current Advanced Directive/Advance Care Plan: Full Code      Healthcare Decision Maker:   Click here to complete [address] including selection of the Healthcare Decision Maker Relationship (ie \"Primary\")           NOK is wife Jorge Dennis                  Transition of Care Plan:                    Patient lives at home with his wife an children in a 3 story home with 5 steps to enter. He states he does not have trouble with stairs/steps. Prior to admission he is independent with ADLs, drives, works. He has no history of home health, and does not use DME. Patient's emergency contact is his wife Jorge Dennis who can be reached at [phone]. She will be transporting him home from the hospital at discharge. Patient sees Dr. Maria Zheng for primary care and last had a visit with Dr. Hettie Schlatter NP about 4 months ago. Patient uses [facility] for prescriptions and they are typically covered by insurance when needed. Confirmed [employer]. No needs identified at this time, patient cleared therapy today. Transition of Care Plan   1. Continue medical management/treatment  2. Home with family when ready   3. Wife will transport at discharge  4. Follow up with PCP, specialties as needed  5. CM will continue to follow    Care Management Interventions  PCP Verified by CM: Yes (Dr. Maria Zheng)  Mode of Transport at Discharge:  Other (see comment) (wife)  Transition of Care Consult (CM Consult): Discharge Planning  Physical Therapy Consult: Yes  Occupational Therapy Consult: Yes  Support Systems: Spouse/Significant Other, Child(isabella)  Confirm Follow Up Transport: Family  Discharge Location  Discharge Placement: Home with family assistance    FABIANA Price

## 2021-11-23 NOTE — PROGRESS NOTES
TRANSFER - IN REPORT:    Verbal report received from [address] (name) on Gabby Moreno  being received from [facility] ED (unit) for routine progression of care      Report consisted of patients Situation, Background, Assessment and   Recommendations(SBAR). Information from the following report(s) SBAR, Kardex, ED Summary, Intake/Output, MAR and Recent Results was reviewed with the receiving nurse. Opportunity for questions and clarification was provided. Assessment completed upon patients arrival to unit and care assumed.

## 2021-11-24 VITALS
HEART RATE: 75 BPM | TEMPERATURE: 98.5 F | DIASTOLIC BLOOD PRESSURE: 85 MMHG | BODY MASS INDEX: 29.06 KG/M2 | WEIGHT: 203 LBS | HEIGHT: 70 IN | SYSTOLIC BLOOD PRESSURE: 126 MMHG | OXYGEN SATURATION: 94 % | RESPIRATION RATE: 18 BRPM

## 2021-11-24 LAB
ANION GAP SERPL CALC-SCNC: 4 MMOL/L (ref 5–15)
ATRIAL RATE: 82 BPM
BUN SERPL-MCNC: 21 MG/DL (ref 6–20)
BUN/CREAT SERPL: 13 (ref 12–20)
CALCIUM SERPL-MCNC: 8.7 MG/DL (ref 8.5–10.1)
CALCULATED P AXIS, ECG09: 73 DEGREES
CALCULATED R AXIS, ECG10: -8 DEGREES
CALCULATED T AXIS, ECG11: 45 DEGREES
CHLORIDE SERPL-SCNC: 109 MMOL/L (ref 97–108)
CO2 SERPL-SCNC: 28 MMOL/L (ref 21–32)
CREAT SERPL-MCNC: 1.58 MG/DL (ref 0.7–1.3)
DIAGNOSIS, 93000: NORMAL
GLUCOSE SERPL-MCNC: 95 MG/DL (ref 65–100)
P-R INTERVAL, ECG05: 146 MS
POTASSIUM SERPL-SCNC: 3.7 MMOL/L (ref 3.5–5.1)
Q-T INTERVAL, ECG07: 380 MS
QRS DURATION, ECG06: 94 MS
QTC CALCULATION (BEZET), ECG08: 443 MS
SODIUM SERPL-SCNC: 141 MMOL/L (ref 136–145)
VENTRICULAR RATE, ECG03: 82 BPM

## 2021-11-24 PROCEDURE — 80048 BASIC METABOLIC PNL TOTAL CA: CPT

## 2021-11-24 PROCEDURE — 36415 COLL VENOUS BLD VENIPUNCTURE: CPT

## 2021-11-24 PROCEDURE — 74011250637 HC RX REV CODE- 250/637: Performed by: INTERNAL MEDICINE

## 2021-11-24 RX ORDER — LEVETIRACETAM 750 MG/1
1500 TABLET, EXTENDED RELEASE ORAL DAILY
COMMUNITY

## 2021-11-24 RX ORDER — LEVETIRACETAM 750 MG/1
2250 TABLET, EXTENDED RELEASE ORAL EVERY EVENING
COMMUNITY

## 2021-11-24 RX ORDER — LAMOTRIGINE 200 MG/1
400 TABLET, EXTENDED RELEASE ORAL 2 TIMES DAILY
COMMUNITY

## 2021-11-24 RX ADMIN — LEVETIRACETAM 1500 MG: 750 TABLET, EXTENDED RELEASE ORAL at 08:39

## 2021-11-24 RX ADMIN — LAMOTRIGINE 400 MG: 200 TABLET, EXTENDED RELEASE ORAL at 09:00

## 2021-11-24 RX ADMIN — TAMSULOSIN HYDROCHLORIDE 0.8 MG: 0.4 CAPSULE ORAL at 08:39

## 2021-11-24 NOTE — DISCHARGE SUMMARY
[facility]  [address] [facility], [id number] [place] [address]  [phone]    Physician Discharge Summary     Patient ID:  Ting Armstrong  [id number]  89 y.o.  1965    Admit date: 11/22/2021    Discharge date and time: 11/24/2021 8:52 AM    Admission Diagnoses: ROSAURA (acute kidney injury) ([address] [N17.9]    Discharge Diagnoses:    1. ROSAURA - sec to obstructive uropathy; bilateral hydronephrosis on US  2. BPH  3. Seizure disorder  4. Overweight - BMI 29.13    Hospital Course: 56 yo M admitted as transfer from [place] for acute urinary retention. Noted to have ROSAURA associated with this and urology consulted, meza placed. Creatinine trended down, recommend outpatient follow up which has been scheduled 11/30 with urology. PCP: Andrea Angelucci, MD     Consults: Urology    Significant Diagnostic Studies: CT HEAD WO CONT    Result Date: 11/22/2021  HEAD CT WITHOUT CONTRAST: 11/22/2021 2:31 PM INDICATION: dizziness COMPARISON: None. PROCEDURE: Axial images of the head were obtained without contrast. Coronal and sagittal reformats were performed. CT dose reduction was achieved through use of a standardized protocol tailored for this examination and automatic exposure control for dose modulation. Adaptive statistical iterative reconstruction (ASIR) was utilized. FINDINGS: The ventricles and sulci are appropriate in size and configuration for age. No loss of gray-white differentiation to suggest late acute or early subacute infarction. No mass effect or intracranial hemorrhage. No acute intracranial abnormality. US RETROPERITONEUM COMP    Result Date: 11/23/2021  EXAM:  US RETROPERITONEUM COMP INDICATION:  acute kidney injury COMPARISON: None. TECHNIQUE: Real-time sonography of the kidneys, retroperitoneum and bladder was performed with multiple static images obtained. FINDINGS: The kidneys have increased echogenicity with no mass, stone or hydronephrosis.  The right kidney measures 13.7 cm and the left kidney measures 13.5 cm in length. There is mild right and moderate left hydronephrosis. The aorta and iliac arteries are obscured by bowel gas. The IVC is normal. No retroperitoneal mass is identified. The urinary bladder is thickened at 2 cm, but decompressed by a Corea catheter. Medical renal disease Bilateral hydronephrosis, asymmetric to the left Marked bladder wall thickening, compatible with cystitis or bladder obstruction      Discharge Exam:  Physical Exam:    Gen: Well-developed, well-nourished, in no acute distress  HEENT:  Pink conjunctivae, PERRL, hearing intact to voice, moist mucous membranes  Neck: Supple, without masses, thyroid non-tender  Resp: No accessory muscle use, clear breath sounds without wheezes rales or rhonchi  Card: No murmurs, normal S1, S2 without thrills, bruits or peripheral edema  Abd:  Soft, non-tender, non-distended, normoactive bowel sounds are present, no palpable organomegaly and no detectable hernias  Lymph:  No cervical or inguinal adenopathy  Musc: No cyanosis or clubbing  Skin: No rashes or ulcers, skin turgor is good  Neuro:  Cranial nerves are grossly intact, no focal motor weakness, follows commands appropriately  Psych:  Good insight, oriented to person, place and time, alert    Disposition: home  Discharge Condition: Stable    Patient Instructions:   Current Discharge Medication List      CONTINUE these medications which have NOT CHANGED    Details   !! levETIRAcetam (Keppra XR) 750 mg ER tablet Take 1,500 mg by mouth daily. Takes 1500mg PO QAM and 2250mg PO QPM; See other order. !! levETIRAcetam (Keppra XR) 750 mg ER tablet Take 2,250 mg by mouth every evening. Takes 1500mg (2 tabs) PO QAM and 2250mg (3 tabs) PO QPM; See other order. lamoTRIgine (LaMICtal XR) 200 mg tr24 ER tablet Take 400 mg by mouth two (2) times a day. tamsulosin (FLOMAX) 0.4 mg capsule Take 0.8 mg by mouth daily.        !! - Potential duplicate medications found. Please discuss with provider. Activity: Activity as tolerated  Diet: Regular Diet  Wound Care: None needed    Follow-up with  Follow-up Information     Follow up With Specialties Details Why Contact Info    Pt's own meds    - Please send pt's own meds Keppra XR ang Lamictal XR with patient upon discharge. Thank you!!! [facility]  On 11/30/2021 Dr. Sandip Driscoll at 3:30pm Jose [facility] Schilling [address] [place] [address], [address] [phone]            Signed:  Ramon Pitts DO  11/24/2021  8:52 AM    Time spent 25 minutes.

## 2021-11-24 NOTE — PROGRESS NOTES
Pharmacist Discharge Medication Reconciliation    Discharge Provider:  Lucero Parrish       Discharge Medications:   Corrected formulation/dosing of Keppra on PTA med list; Patient is taking Keppra XR, not Keppra as previously documented; Also verified dose of 2 tabs PO QAM and 3 tabs PO QPM; Patient also taking Lamictal XR 400mg PO BID, not 200mg daily as previously documented. Informed patient to continue with current home schedule. Below med list updated with correct history; Will inform MD to update d/c info with correct dosing/formulations. My Medications        CONTINUE taking these medications        Instructions Each Dose to Equal Morning Noon Evening Bedtime   Keppra 750 mg tablet  Generic drug: levETIRAcetam    Your last dose was: Your next dose is: Take 750 mg by mouth two (2) times a day. 2 in am and 3 in PM   750 mg                 LaMICtal  mg Tr24 ER tablet  Generic drug: lamoTRIgine    Your last dose was: Your next dose is: Take 200 mg by mouth daily. 2 in am and 2 in PM   200 mg                 tamsulosin 0.4 mg capsule  Commonly known as: FLOMAX    Your last dose was: Your next dose is: Take 0.8 mg by mouth daily.    0.8 mg                           The patient's chart, MAR, and AVS were reviewed by   Darshan Wagoner,   Contact: [phone]

## 2021-11-24 NOTE — PROGRESS NOTES
Bedside and Verbal shift change report given to Rei Link (oncoming nurse) by Kenyatta Prieto (offgoing nurse). Report included the following information SBAR, Kardex and MAR.

## 2021-11-24 NOTE — DISCHARGE INSTRUCTIONS
HOSPITALIST DISCHARGE INSTRUCTIONS  NAME: Sky Spear   :  1965   MRN:  [id number]     Date/Time:  2021 8:55 AM    ADMIT DATE: 2021     DISCHARGE DATE: 2021     ADMITTING DIAGNOSIS:  ROSAURA     DISCHARGE DIAGNOSIS:  1. ROSAURA - sec to obstructive uropathy; bilateral hydronephrosis on US  2. BPH  3. Seizure disorder  4. Overweight - BMI 29.13    MEDICATIONS:  · It is important that you take the medication exactly as they are prescribed. · Keep your medication in the bottles provided by the pharmacist and keep a list of the medication names, dosages, and times to be taken in your wallet. · Do not take other medications without consulting your doctor     Pain Management: per above medications    What to do at Home    Recommended diet:  Regular Diet    Recommended activity: Activity as tolerated    1) Return to the hospital if you feel worse    2) If you experience any of the following symptoms then please call your primary care physician or return to the emergency room if you cannot get hold of your doctor:  Fever, chills, nausea, vomiting, diarrhea, change in mentation, falling, bleeding, shortness of breath, chest pain, severe headache, severe abdominal pain. Follow Up: Follow-up Information     Follow up With Specialties Details Why Contact Info    Pt's own meds    - Please send pt's own meds Keppra XR ang Lamictal XR with patient upon discharge. Thank you!!! [facility]  On 2021 Dr. Daina White at 3:30pm [facility] Schilling [address] [place] [address] Family Medicine     [address]  [phone]              Information obtained by :  I understand that if any problems occur once I am at home I am to contact my physician. I understand and acknowledge receipt of the instructions indicated above. Physician's or R.N.'s Signature                                                                  Date/Time                                                                                                                                              Patient or Representative Signature                                                          Date/Time

## 2021-11-24 NOTE — PROGRESS NOTES
11/24/2021  Case Management Progress Note    12:15 PM  Patient is 55year old male admitted 11/22 with ROSAURA  Patient's RUR is 5% green/low risk for readmission  Covid test: none this admission  Chart reviewed--patient discussed at IDR rounds  Patient has a discharge order in--will return home today. He does not have any noted needs from us and is okay for discharge from  standpoint. Transition of Care Plan   1. Discharge today, order in   2. Home with family assistance   3. Family will transport  4. No needs from [address] [place] [address] standpoint    Care Management Interventions  PCP Verified by CM: Yes (Dr. Kathy Claire)  Mode of Transport at Discharge:  Other (see comment) (wife)  Transition of Care Consult (CM Consult): Discharge Planning  Physical Therapy Consult: Yes  Occupational Therapy Consult: Yes  Support Systems: Spouse/Significant Other, Child(isabella)  Confirm Follow Up Transport: Family  Discharge Location  Discharge Placement: Home with family assistance    FABIANA Francis

## 2021-11-24 NOTE — PROGRESS NOTES
I have reviewed discharge instructions with the patient. The patient verbalized understanding. Pt is going home with omar Corea IV removed upon D/C.

## 2021-11-24 NOTE — PROGRESS NOTES
Bedside and Verbal shift change report given to Pedro Luis Curiel RN (oncoming nurse) by Edgar Alcaraz RN (offgoing nurse). Report included the following information SBAR, Kardex, ED Summary and Recent Results.

## 2022-03-18 PROBLEM — G40.909 SEIZURE DISORDER (HCC): Status: ACTIVE | Noted: 2021-11-23

## 2022-03-19 PROBLEM — R03.0 ELEVATED BP WITHOUT DIAGNOSIS OF HYPERTENSION: Status: ACTIVE | Noted: 2021-11-23

## 2022-03-19 PROBLEM — N40.0 BPH (BENIGN PROSTATIC HYPERPLASIA): Status: ACTIVE | Noted: 2021-11-23

## 2022-03-20 PROBLEM — N17.9 AKI (ACUTE KIDNEY INJURY) (HCC): Status: ACTIVE | Noted: 2021-11-22

## 2023-05-11 RX ORDER — TAMSULOSIN HYDROCHLORIDE 0.4 MG/1
CAPSULE ORAL DAILY
COMMUNITY

## 2023-05-11 RX ORDER — LAMOTRIGINE 200 MG/1
TABLET, EXTENDED RELEASE ORAL 2 TIMES DAILY
COMMUNITY

## 2023-05-11 RX ORDER — LEVETIRACETAM 750 MG/1
TABLET, EXTENDED RELEASE ORAL DAILY
COMMUNITY